# Patient Record
Sex: MALE | Race: WHITE | NOT HISPANIC OR LATINO | URBAN - NONMETROPOLITAN AREA
[De-identification: names, ages, dates, MRNs, and addresses within clinical notes are randomized per-mention and may not be internally consistent; named-entity substitution may affect disease eponyms.]

---

## 2023-04-07 ENCOUNTER — OFFICE VISIT (OUTPATIENT)
Dept: PRIMARY CARE | Facility: CLINIC | Age: 23
End: 2023-04-07
Payer: COMMERCIAL

## 2023-04-07 ENCOUNTER — LAB (OUTPATIENT)
Dept: LAB | Facility: LAB | Age: 23
End: 2023-04-07
Payer: COMMERCIAL

## 2023-04-07 VITALS
HEART RATE: 86 BPM | OXYGEN SATURATION: 98 % | WEIGHT: 253.6 LBS | DIASTOLIC BLOOD PRESSURE: 76 MMHG | BODY MASS INDEX: 33.61 KG/M2 | SYSTOLIC BLOOD PRESSURE: 109 MMHG | HEIGHT: 73 IN

## 2023-04-07 DIAGNOSIS — E55.9 VITAMIN D DEFICIENCY: ICD-10-CM

## 2023-04-07 DIAGNOSIS — E78.5 DYSLIPIDEMIA: ICD-10-CM

## 2023-04-07 DIAGNOSIS — R53.83 OTHER FATIGUE: ICD-10-CM

## 2023-04-07 DIAGNOSIS — E66.09 CLASS 1 OBESITY DUE TO EXCESS CALORIES WITH SERIOUS COMORBIDITY AND BODY MASS INDEX (BMI) OF 33.0 TO 33.9 IN ADULT: ICD-10-CM

## 2023-04-07 DIAGNOSIS — E78.5 DYSLIPIDEMIA: Primary | ICD-10-CM

## 2023-04-07 DIAGNOSIS — J30.89 NON-SEASONAL ALLERGIC RHINITIS DUE TO OTHER ALLERGIC TRIGGER: ICD-10-CM

## 2023-04-07 DIAGNOSIS — Z13.9 SCREENING FOR CONDITION: ICD-10-CM

## 2023-04-07 PROBLEM — J02.9 SORE THROAT: Status: ACTIVE | Noted: 2023-04-07

## 2023-04-07 PROBLEM — R55 SYNCOPE: Status: ACTIVE | Noted: 2023-04-07

## 2023-04-07 PROBLEM — R74.8 ABNORMAL LIVER ENZYMES: Status: ACTIVE | Noted: 2023-04-07

## 2023-04-07 PROBLEM — E78.1 HYPERTRIGLYCERIDEMIA: Status: ACTIVE | Noted: 2023-04-07

## 2023-04-07 PROBLEM — R09.1 PLEURISY: Status: ACTIVE | Noted: 2023-04-07

## 2023-04-07 PROBLEM — E66.9 OBESITY: Status: ACTIVE | Noted: 2023-04-07

## 2023-04-07 PROBLEM — L83 ACANTHOSIS NIGRICANS: Status: ACTIVE | Noted: 2023-04-07

## 2023-04-07 PROBLEM — E73.9 LACTOSE INTOLERANCE: Status: ACTIVE | Noted: 2023-04-07

## 2023-04-07 PROBLEM — K75.81 NASH (NONALCOHOLIC STEATOHEPATITIS): Status: ACTIVE | Noted: 2023-04-07

## 2023-04-07 PROBLEM — I10 HYPERTENSION: Status: ACTIVE | Noted: 2023-04-07

## 2023-04-07 PROBLEM — E16.1 INCREASED INSULIN LEVEL: Status: ACTIVE | Noted: 2023-04-07

## 2023-04-07 PROBLEM — K21.9 GASTROESOPHAGEAL REFLUX DISEASE: Status: ACTIVE | Noted: 2023-04-07

## 2023-04-07 PROBLEM — M54.9 MID BACK PAIN: Status: ACTIVE | Noted: 2023-04-07

## 2023-04-07 PROBLEM — E66.3 OVER WEIGHT: Status: ACTIVE | Noted: 2023-04-07

## 2023-04-07 PROBLEM — M54.9 UPPER BACK PAIN ON LEFT SIDE: Status: ACTIVE | Noted: 2023-04-07

## 2023-04-07 PROBLEM — E80.6 HYPERBILIRUBINEMIA: Status: ACTIVE | Noted: 2023-04-07

## 2023-04-07 PROBLEM — K76.0 NAFLD (NONALCOHOLIC FATTY LIVER DISEASE): Status: ACTIVE | Noted: 2023-04-07

## 2023-04-07 PROBLEM — R20.2 NUMBNESS AND TINGLING: Status: ACTIVE | Noted: 2023-04-07

## 2023-04-07 PROBLEM — R20.0 NUMBNESS AND TINGLING: Status: ACTIVE | Noted: 2023-04-07

## 2023-04-07 PROBLEM — F41.9 ANXIETY: Status: ACTIVE | Noted: 2023-04-07

## 2023-04-07 PROBLEM — K76.0 HEPATIC STEATOSIS: Status: ACTIVE | Noted: 2023-04-07

## 2023-04-07 PROBLEM — R00.0 RAPID HEART BEAT: Status: ACTIVE | Noted: 2023-04-07

## 2023-04-07 LAB
ALANINE AMINOTRANSFERASE (SGPT) (U/L) IN SER/PLAS: 45 U/L (ref 10–52)
ALBUMIN (G/DL) IN SER/PLAS: 4.5 G/DL (ref 3.4–5)
ALKALINE PHOSPHATASE (U/L) IN SER/PLAS: 58 U/L (ref 33–120)
ANION GAP IN SER/PLAS: 13 MMOL/L (ref 10–20)
APPEARANCE, URINE: NORMAL
ASPARTATE AMINOTRANSFERASE (SGOT) (U/L) IN SER/PLAS: 25 U/L (ref 9–39)
BASOPHILS (10*3/UL) IN BLOOD BY AUTOMATED COUNT: 0.06 X10E9/L (ref 0–0.1)
BASOPHILS/100 LEUKOCYTES IN BLOOD BY AUTOMATED COUNT: 0.9 % (ref 0–2)
BILIRUBIN TOTAL (MG/DL) IN SER/PLAS: 1.4 MG/DL (ref 0–1.2)
BILIRUBIN, URINE: NEGATIVE
BLOOD, URINE: NEGATIVE
CALCIUM (MG/DL) IN SER/PLAS: 9.3 MG/DL (ref 8.6–10.3)
CARBON DIOXIDE, TOTAL (MMOL/L) IN SER/PLAS: 27 MMOL/L (ref 21–32)
CHLORIDE (MMOL/L) IN SER/PLAS: 103 MMOL/L (ref 98–107)
CHOLESTEROL (MG/DL) IN SER/PLAS: 181 MG/DL (ref 0–199)
CHOLESTEROL IN HDL (MG/DL) IN SER/PLAS: 43.6 MG/DL
CHOLESTEROL/HDL RATIO: 4.2
COLOR, URINE: NORMAL
CREATININE (MG/DL) IN SER/PLAS: 0.78 MG/DL (ref 0.5–1.3)
EOSINOPHILS (10*3/UL) IN BLOOD BY AUTOMATED COUNT: 0.11 X10E9/L (ref 0–0.7)
EOSINOPHILS/100 LEUKOCYTES IN BLOOD BY AUTOMATED COUNT: 1.7 % (ref 0–6)
ERYTHROCYTE DISTRIBUTION WIDTH (RATIO) BY AUTOMATED COUNT: 12.7 % (ref 11.5–14.5)
ERYTHROCYTE MEAN CORPUSCULAR HEMOGLOBIN CONCENTRATION (G/DL) BY AUTOMATED: 33.2 G/DL (ref 32–36)
ERYTHROCYTE MEAN CORPUSCULAR VOLUME (FL) BY AUTOMATED COUNT: 88 FL (ref 80–100)
ERYTHROCYTES (10*6/UL) IN BLOOD BY AUTOMATED COUNT: 5.53 X10E12/L (ref 4.5–5.9)
GFR MALE: >90 ML/MIN/1.73M2
GLUCOSE (MG/DL) IN SER/PLAS: 79 MG/DL (ref 74–99)
GLUCOSE, URINE: NEGATIVE MG/DL
HEMATOCRIT (%) IN BLOOD BY AUTOMATED COUNT: 48.5 % (ref 41–52)
HEMOGLOBIN (G/DL) IN BLOOD: 16.1 G/DL (ref 13.5–17.5)
IMMATURE GRANULOCYTES/100 LEUKOCYTES IN BLOOD BY AUTOMATED COUNT: 0.2 % (ref 0–0.9)
KETONES, URINE: NEGATIVE MG/DL
LDL: 121 MG/DL (ref 0–119)
LEUKOCYTE ESTERASE, URINE: NEGATIVE
LEUKOCYTES (10*3/UL) IN BLOOD BY AUTOMATED COUNT: 6.6 X10E9/L (ref 4.4–11.3)
LYMPHOCYTES (10*3/UL) IN BLOOD BY AUTOMATED COUNT: 2.69 X10E9/L (ref 1.2–4.8)
LYMPHOCYTES/100 LEUKOCYTES IN BLOOD BY AUTOMATED COUNT: 40.8 % (ref 13–44)
MONOCYTES (10*3/UL) IN BLOOD BY AUTOMATED COUNT: 0.49 X10E9/L (ref 0.1–1)
MONOCYTES/100 LEUKOCYTES IN BLOOD BY AUTOMATED COUNT: 7.4 % (ref 2–10)
NEUTROPHILS (10*3/UL) IN BLOOD BY AUTOMATED COUNT: 3.24 X10E9/L (ref 1.2–7.7)
NEUTROPHILS/100 LEUKOCYTES IN BLOOD BY AUTOMATED COUNT: 49 % (ref 40–80)
NITRITE, URINE: NEGATIVE
NON HDL CHOLESTEROL: 137 MG/DL (ref 0–149)
PH, URINE: 5 (ref 5–8)
PLATELETS (10*3/UL) IN BLOOD AUTOMATED COUNT: 268 X10E9/L (ref 150–450)
POTASSIUM (MMOL/L) IN SER/PLAS: 4.1 MMOL/L (ref 3.5–5.3)
PROTEIN TOTAL: 7.2 G/DL (ref 6.4–8.2)
PROTEIN, URINE: NEGATIVE MG/DL
SODIUM (MMOL/L) IN SER/PLAS: 139 MMOL/L (ref 136–145)
SPECIFIC GRAVITY, URINE: 1.03 (ref 1–1.03)
THYROTROPIN (MIU/L) IN SER/PLAS BY DETECTION LIMIT <= 0.05 MIU/L: 1.09 MIU/L (ref 0.44–3.98)
TRIGLYCERIDE (MG/DL) IN SER/PLAS: 84 MG/DL (ref 0–149)
UREA NITROGEN (MG/DL) IN SER/PLAS: 11 MG/DL (ref 6–23)
UROBILINOGEN, URINE: <2 MG/DL (ref 0–1.9)
VLDL: 17 MG/DL (ref 0–40)

## 2023-04-07 PROCEDURE — 99213 OFFICE O/P EST LOW 20 MIN: CPT | Performed by: FAMILY MEDICINE

## 2023-04-07 PROCEDURE — 36415 COLL VENOUS BLD VENIPUNCTURE: CPT

## 2023-04-07 PROCEDURE — 1036F TOBACCO NON-USER: CPT | Performed by: FAMILY MEDICINE

## 2023-04-07 PROCEDURE — 82306 VITAMIN D 25 HYDROXY: CPT

## 2023-04-07 PROCEDURE — 3078F DIAST BP <80 MM HG: CPT | Performed by: FAMILY MEDICINE

## 2023-04-07 PROCEDURE — 81003 URINALYSIS AUTO W/O SCOPE: CPT

## 2023-04-07 PROCEDURE — 82607 VITAMIN B-12: CPT

## 2023-04-07 PROCEDURE — 84443 ASSAY THYROID STIM HORMONE: CPT

## 2023-04-07 PROCEDURE — 3074F SYST BP LT 130 MM HG: CPT | Performed by: FAMILY MEDICINE

## 2023-04-07 PROCEDURE — 85025 COMPLETE CBC W/AUTO DIFF WBC: CPT

## 2023-04-07 PROCEDURE — 86803 HEPATITIS C AB TEST: CPT

## 2023-04-07 PROCEDURE — 82746 ASSAY OF FOLIC ACID SERUM: CPT

## 2023-04-07 PROCEDURE — 80061 LIPID PANEL: CPT

## 2023-04-07 PROCEDURE — 87389 HIV-1 AG W/HIV-1&-2 AB AG IA: CPT

## 2023-04-07 PROCEDURE — 80053 COMPREHEN METABOLIC PANEL: CPT

## 2023-04-07 PROCEDURE — 3008F BODY MASS INDEX DOCD: CPT | Performed by: FAMILY MEDICINE

## 2023-04-07 RX ORDER — CHOLECALCIFEROL (VITAMIN D3) 25 MCG
TABLET,CHEWABLE ORAL
COMMUNITY
Start: 2021-10-04 | End: 2023-05-03 | Stop reason: ALTCHOICE

## 2023-04-07 RX ORDER — ERGOCALCIFEROL 1.25 1/1
1 CAPSULE ORAL
COMMUNITY
Start: 2022-11-14 | End: 2023-05-03 | Stop reason: ALTCHOICE

## 2023-04-07 RX ORDER — FLUTICASONE PROPIONATE 50 MCG
2 SPRAY, SUSPENSION (ML) NASAL 2 TIMES DAILY
COMMUNITY
Start: 2022-11-14

## 2023-04-07 ASSESSMENT — PAIN SCALES - GENERAL: PAINLEVEL: 0-NO PAIN

## 2023-04-07 NOTE — PROGRESS NOTES
"Subjective     Billy Madrigal is a 23 y.o. male who presents for Follow-up (Follow up meds).      HPI  The patient is a 23 year-old male presenting to the clinic for follow up     Follow-up.  Educated on dyslipidemia and diet and exercise.  Complaining feeling tired.  Advised on diet exercise.  Educated on dyslipidemia and diet exercise.  Educated on vitamin deficiency.  Educated on obesity and diet and exercise.  Advised lose weight.        Review of Systems  Review of systems    General.  Denies fever.  Denies chills.    HEENT denies nasal congestion.  Denies sinus pressure.    Respiratory.  Denies cough.  Denies shortness of breath.    Cardiovascular.  Denies chest pain.  Denies heart palpitations.  Denies shortness of breath.    Gastrointestinal.  Denies nausea vomiting diarrhea.  Denies abdominal pain.    Genitourinary denies burning urination.  Denies frequent urination.  Denies flank pain.  Denies blood in the urine.  Denies abnormal vaginal discharge.    Neurology.  Denies tingling numbness but denies weakness.  Denies headache.  Denies blurred vision.    Musculoskeletal.  Denies body aches.  Denies joint pains.  Denies muscle aches.  Denies muscle weakness    Endocrinology.  Denies cold intolerance.  Denies hot intolerance.    Psychiatric.  Denies depression.  Denies anxiety.  Denies suicidal.  Denies homicidal.      Objective   /76 (BP Location: Left arm, Patient Position: Sitting, BP Cuff Size: Large adult)   Pulse 86   Ht 1.854 m (6' 1\")   Wt 115 kg (253 lb 9.6 oz)   SpO2 98%   BMI 33.46 kg/m²        Physical Exam  General.  Not in distress.  HEENT normocephalic anicteric sclerae.  Neck soft supple no thyromegaly.   Lungs are clear.  Heart regular.  Abdomen soft nontender nondistended bowel sounds are positive.  Extremities no clubbing cyanosis or edema.  Psychiatric.  Has good eye contact.  No crying spells noted.  Speech was normal.  Denies depression.  Denies suicidal.  Denies " homicidal.      Assessment/Plan   1.  Dyslipidemia.  Educated on diet exercise.  Advised to lose weight we will continue monitor           Fatigue.  Educated on diet exercise.  Advised lose weight.    3.  Vitamin D deficiency.  Educated on vitamin D deficiency.  We will continue monitor.    4.    Obesity.  Dictated as above.      Problem List Items Addressed This Visit          Endocrine/Metabolic    Vitamin D deficiency    Relevant Orders    Vitamin D 25-Hydroxy,Total    Class 1 obesity due to excess calories with serious comorbidity and body mass index (BMI) of 33.0 to 33.9 in adult       Other    Fatigue    Relevant Orders    CBC and Auto Differential    Comprehensive metabolic panel    Urinalysis with Reflex Microscopic    Tsh With Reflex To Free T4 If Abnormal    Vitamin B12    Folate     Other Visit Diagnoses       Dyslipidemia    -  Primary    Relevant Orders    Lipid panel    Screening for condition        Relevant Orders    HIV 1/2 antibodies, rapid    Hepatitis C antibody    Non-seasonal allergic rhinitis due to other allergic trigger                Scribe Attestation  By signing my name below, Maricruz SALGADO Scribe   attest that this documentation has been prepared under the direction and in the presence of Lai Kapadia MD.

## 2023-04-08 LAB
CALCIDIOL (25 OH VITAMIN D3) (NG/ML) IN SER/PLAS: 21 NG/ML
COBALAMIN (VITAMIN B12) (PG/ML) IN SER/PLAS: 355 PG/ML (ref 211–911)
FOLATE (NG/ML) IN SER/PLAS: 9.8 NG/ML
HEPATITIS C VIRUS AB PRESENCE IN SERUM: NONREACTIVE
HIV 1/ 2 AG/AB SCREEN: NONREACTIVE

## 2023-05-02 NOTE — PROGRESS NOTES
"Subjective     Billy Madrigal is a 23 y.o. male who presents for Follow-up (Follow up /results).  Today he is accompanied by accompanied by his brother.     HPI  The patient is a 23 year-old male presenting to the clinic for follow up on lab results. I have reviewed results from his most recent blood work with him.   Complete blood work ordered 5/3/2023. I have educated the patient on fatigue. The patient denies restless leg syndrome and denies obstructive sleep apnea.  Educated on vitamin D deficiency.  Start vitamin D 1.25 mg (65146 units), take 1 oral capsule weekly.    Educated the patient on obesity, low-fat diet and exercise. Encouraged the patient to lose weight.  Complete blood work after fasting for 10 hours.  Follow up in office.       Review of Systems  Review of systems    General.  Denies fever.  Denies chills.    HEENT denies nasal congestion.  Denies sinus pressure.    Respiratory.  Denies cough.  Denies shortness of breath.    Cardiovascular.  Denies chest pain.  Denies heart palpitations.  Denies shortness of breath.    Gastrointestinal.  Denies nausea vomiting diarrhea.  Denies abdominal pain.    Genitourinary denies burning urination.  Denies frequent urination.  Denies flank pain.  Denies blood in the urine.  Denies abnormal vaginal discharge.    Neurology.  Dictated as above.      Musculoskeletal.  Denies body aches.  Denies joint pains.  Denies muscle aches.  Denies muscle weakness    Endocrinology. Dictated as above.      Psychiatric.  Denies depression.  Denies anxiety.  Denies suicidal.  Denies homicidal.      Objective   /68 (BP Location: Left arm, Patient Position: Sitting, BP Cuff Size: Adult)   Pulse 72   Ht 1.854 m (6' 1\")   Wt 113 kg (248 lb 12.8 oz)   SpO2 98%   BMI 32.83 kg/m²        Physical Exam  General.  Not in distress.  HEENT normocephalic anicteric sclerae.  Neck soft supple no thyromegaly.  No carotid bruit.  Lungs are clear.  Heart regular.  Abdomen soft " nontender nondistended bowel sounds are positive.  Extremities no clubbing cyanosis or edema.  Psychiatric.  Has good eye contact.  No crying spells noted.  Speech was normal.  Denies depression.  Denies suicidal.  Denies homicidal.  Component      Latest Ref Rng 4/7/2023   WBC      4.4 - 11.3 x10E9/L 6.6    RBC      4.50 - 5.90 x10E12/L 5.53    HEMOGLOBIN      13.5 - 17.5 g/dL 16.1    HEMATOCRIT      41.0 - 52.0 % 48.5    MCV      80 - 100 fL 88    MCHC      32.0 - 36.0 g/dL 33.2    Platelets      150 - 450 x10E9/L 268    RED CELL DISTRIBUTION WIDTH      11.5 - 14.5 % 12.7    Neutrophils %      40.0 - 80.0 % 49.0    Immature Granulocytes %, Automated      0.0 - 0.9 % 0.2    Lymphocytes %      13.0 - 44.0 % 40.8    Monocytes %      2.0 - 10.0 % 7.4    Eosinophils %      0.0 - 6.0 % 1.7    Basophils %      0.0 - 2.0 % 0.9    Neutrophils Absolute      1.20 - 7.70 x10E9/L 3.24    Lymphocytes Absolute      1.20 - 4.80 x10E9/L 2.69    Monocytes Absolute      0.10 - 1.00 x10E9/L 0.49    Eosinophils Absolute      0.00 - 0.70 x10E9/L 0.11    Basophils Absolute      0.00 - 0.10 x10E9/L 0.06    GLUCOSE      74 - 99 mg/dL 79    SODIUM      136 - 145 mmol/L 139    POTASSIUM      3.5 - 5.3 mmol/L 4.1    CHLORIDE      98 - 107 mmol/L 103    Bicarbonate      21 - 32 mmol/L 27    Anion Gap      10 - 20 mmol/L 13    Blood Urea Nitrogen      6 - 23 mg/dL 11    Creatinine      0.50 - 1.30 mg/dL 0.78    GFR MALE      >90 mL/min/1.73m2 >90    Calcium      8.6 - 10.3 mg/dL 9.3    Albumin      3.4 - 5.0 g/dL 4.5    Alkaline Phosphatase      33 - 120 U/L 58    Total Protein      6.4 - 8.2 g/dL 7.2    AST      9 - 39 U/L 25    Bilirubin Total      0.0 - 1.2 mg/dL 1.4 (H)    ALT      10 - 52 U/L 45    Color, Urine      STRAW,YELLOW  ANKIT    Appearance, Urine      CLEAR  HAZY    Specific Gravity, Urine      1.005 - 1.035  1.029    pH, Urine      5.0 - 8.0  5.0    Protein, Urine      NEGATIVE mg/dL NEGATIVE    Glucose, Urine      NEGATIVE  mg/dL NEGATIVE    Blood, Urine      NEGATIVE  NEGATIVE    Ketones, Urine      NEGATIVE mg/dL NEGATIVE    Bilirubin, Urine      NEGATIVE  NEGATIVE    Urobilinogen, Urine      0.0 - 1.9 mg/dL <2.0    Nitrite, Urine      NEGATIVE  NEGATIVE    Leukocyte Esterase, Urine      NEGATIVE  NEGATIVE    CHOLESTEROL      0 - 199 mg/dL 181    HDL CHOLESTEROL      mg/dL 43.6    Cholesterol/HDL Ratio 4.2    LDL      0 - 119 mg/dL 121 (H)    VLDL      0 - 40 mg/dL 17    TRIGLYCERIDES      0 - 149 mg/dL 84    Non HDL Cholesterol      0 - 149 mg/dL 137    HIV 1/2 Antigen/Antibody Screen with Reflex to Confirmation      NONREACTIVE  NONREACTIVE    Hepatitis C AB      NONREACTIVE  NONREACTIVE    Thyroid Stimulating Hormone      0.44 - 3.98 mIU/L 1.09    Vitamin B12      211 - 911 pg/mL 355    FOLATE      >5.0 ng/mL 9.8    Vitamin D, 25-Hydroxy, Total      ng/mL 21 !       Legend:  (H) High  ! Abnormal        Assessment/Plan     1.  Vitamin D deficiency.  Educated on vitamin D deficiency we will continue monitor.    2.  Obesity.  Educated on diet and exercise.  Advised to lose weight.    3.  Fatigue.  Educated on diet and exercise.  Advised to lose weight.    4.  Hypertriglyceridemia.  Educated on diet exercise.  Advised to lose weight we will continue monitor    5.  Dyslipidemia.  Educated on diet exercise.  Advised to lose weight we will continue monitor.    Hyperbilirubinemia.  Educated on hyperbilirubinemia.  Has no complaints.  We will continue monitor.                          Problem List Items Addressed This Visit          Endocrine/Metabolic    Vitamin D deficiency - Primary    Relevant Medications    ergocalciferol (Vitamin D-2) 1.25 MG (42744 UT) capsule    Other Relevant Orders    Vitamin D 25-Hydroxy,Total    Class 1 obesity due to excess calories with serious comorbidity and body mass index (BMI) of 32.0 to 32.9 in adult       Other    Fatigue    Relevant Medications    folic acid (Folvite) 1 mg tablet    cyanocobalamin  (Vitamin B-12) 500 mcg tablet    Other Relevant Orders    Vitamin B12    Folate    Hyperbilirubinemia    Hypertriglyceridemia     Other Visit Diagnoses       Dyslipidemia              Scribe Attestation  By signing my name below, I, Krista Reina   attest that this documentation has been prepared under the direction and in the presence of Lai Kapadia MD.

## 2023-05-03 ENCOUNTER — OFFICE VISIT (OUTPATIENT)
Dept: PRIMARY CARE | Facility: CLINIC | Age: 23
End: 2023-05-03
Payer: COMMERCIAL

## 2023-05-03 VITALS
HEIGHT: 73 IN | DIASTOLIC BLOOD PRESSURE: 68 MMHG | SYSTOLIC BLOOD PRESSURE: 116 MMHG | BODY MASS INDEX: 32.97 KG/M2 | OXYGEN SATURATION: 98 % | WEIGHT: 248.8 LBS | HEART RATE: 72 BPM

## 2023-05-03 DIAGNOSIS — E78.1 HYPERTRIGLYCERIDEMIA: ICD-10-CM

## 2023-05-03 DIAGNOSIS — E66.09 CLASS 1 OBESITY DUE TO EXCESS CALORIES WITH SERIOUS COMORBIDITY AND BODY MASS INDEX (BMI) OF 32.0 TO 32.9 IN ADULT: ICD-10-CM

## 2023-05-03 DIAGNOSIS — E80.6 HYPERBILIRUBINEMIA: ICD-10-CM

## 2023-05-03 DIAGNOSIS — R53.83 OTHER FATIGUE: ICD-10-CM

## 2023-05-03 DIAGNOSIS — E78.5 DYSLIPIDEMIA: ICD-10-CM

## 2023-05-03 DIAGNOSIS — E55.9 VITAMIN D DEFICIENCY: Primary | ICD-10-CM

## 2023-05-03 PROCEDURE — 3008F BODY MASS INDEX DOCD: CPT | Performed by: FAMILY MEDICINE

## 2023-05-03 PROCEDURE — 3078F DIAST BP <80 MM HG: CPT | Performed by: FAMILY MEDICINE

## 2023-05-03 PROCEDURE — 3074F SYST BP LT 130 MM HG: CPT | Performed by: FAMILY MEDICINE

## 2023-05-03 PROCEDURE — 99214 OFFICE O/P EST MOD 30 MIN: CPT | Performed by: FAMILY MEDICINE

## 2023-05-03 PROCEDURE — 1036F TOBACCO NON-USER: CPT | Performed by: FAMILY MEDICINE

## 2023-05-03 RX ORDER — UBIDECARENONE 75 MG
1000 CAPSULE ORAL DAILY
Qty: 60 TABLET | Refills: 1 | Status: SHIPPED | OUTPATIENT
Start: 2023-05-03 | End: 2023-07-02

## 2023-05-03 RX ORDER — ERGOCALCIFEROL 1.25 MG/1
50000 CAPSULE ORAL
Qty: 12 CAPSULE | Refills: 0 | Status: SHIPPED | OUTPATIENT
Start: 2023-05-03 | End: 2023-08-01

## 2023-05-03 RX ORDER — FOLIC ACID 1 MG/1
1 TABLET ORAL DAILY
Qty: 30 TABLET | Refills: 1 | Status: SHIPPED | OUTPATIENT
Start: 2023-05-03 | End: 2023-07-02

## 2023-05-03 ASSESSMENT — PAIN SCALES - GENERAL: PAINLEVEL: 0-NO PAIN
